# Patient Record
Sex: FEMALE | Race: WHITE | NOT HISPANIC OR LATINO | URBAN - METROPOLITAN AREA
[De-identification: names, ages, dates, MRNs, and addresses within clinical notes are randomized per-mention and may not be internally consistent; named-entity substitution may affect disease eponyms.]

---

## 2017-10-31 ENCOUNTER — OFFICE VISIT (OUTPATIENT)
Dept: NEUROSURGERY | Facility: CLINIC | Age: 67
End: 2017-10-31

## 2017-10-31 VITALS — WEIGHT: 144 LBS | BODY MASS INDEX: 23.99 KG/M2 | HEIGHT: 65 IN | TEMPERATURE: 97 F

## 2017-10-31 DIAGNOSIS — Z98.1 HISTORY OF LUMBAR FUSION: ICD-10-CM

## 2017-10-31 DIAGNOSIS — M51.36 DEGENERATIVE DISC DISEASE, LUMBAR: ICD-10-CM

## 2017-10-31 DIAGNOSIS — M43.16 SPONDYLOLISTHESIS OF LUMBAR REGION: Primary | ICD-10-CM

## 2017-10-31 DIAGNOSIS — M47.816 FACET ARTHRITIS OF LUMBAR REGION: ICD-10-CM

## 2017-10-31 PROCEDURE — 99203 OFFICE O/P NEW LOW 30 MIN: CPT | Performed by: NEUROLOGICAL SURGERY

## 2017-10-31 RX ORDER — OMEPRAZOLE 20 MG/1
20 CAPSULE, DELAYED RELEASE ORAL DAILY
Refills: 3 | COMMUNITY
Start: 2017-10-23

## 2017-10-31 RX ORDER — ANASTROZOLE 1 MG/1
1 TABLET ORAL DAILY
Refills: 8 | COMMUNITY
Start: 2017-09-30

## 2017-10-31 RX ORDER — LEVETIRACETAM 500 MG/1
500 TABLET ORAL 2 TIMES DAILY
Refills: 1 | COMMUNITY
Start: 2017-07-27

## 2017-10-31 RX ORDER — LORAZEPAM 0.5 MG/1
0.5 TABLET ORAL EVERY 8 HOURS PRN
COMMUNITY

## 2017-10-31 RX ORDER — DULOXETIN HYDROCHLORIDE 60 MG/1
60 CAPSULE, DELAYED RELEASE ORAL DAILY
Refills: 3 | COMMUNITY
Start: 2017-08-15

## 2017-10-31 RX ORDER — OXYCODONE AND ACETAMINOPHEN 10; 325 MG/1; MG/1
1 TABLET ORAL EVERY 4 HOURS PRN
COMMUNITY

## 2017-10-31 RX ORDER — TRAZODONE HYDROCHLORIDE 100 MG/1
100 TABLET ORAL NIGHTLY
Refills: 2 | COMMUNITY
Start: 2017-09-06

## 2017-10-31 RX ORDER — LEVOTHYROXINE SODIUM 0.05 MG/1
50 TABLET ORAL DAILY PRN
Refills: 1 | COMMUNITY
Start: 2017-10-23

## 2017-10-31 RX ORDER — PROMETHAZINE HYDROCHLORIDE 25 MG/1
25 TABLET ORAL EVERY 6 HOURS PRN
COMMUNITY

## 2017-10-31 RX ORDER — TRAMADOL HYDROCHLORIDE 50 MG/1
50 TABLET ORAL 2 TIMES DAILY PRN
Refills: 3 | COMMUNITY
Start: 2017-09-28

## 2017-10-31 RX ORDER — LOVASTATIN 40 MG/1
40 TABLET ORAL DAILY
Refills: 3 | COMMUNITY
Start: 2017-09-30

## 2017-10-31 RX ORDER — DONEPEZIL HYDROCHLORIDE 10 MG/1
10 TABLET, FILM COATED ORAL DAILY
Refills: 3 | COMMUNITY
Start: 2017-09-28

## 2017-10-31 RX ORDER — GABAPENTIN 800 MG/1
800 TABLET ORAL 3 TIMES DAILY
Refills: 1 | COMMUNITY
Start: 2017-09-28

## 2017-10-31 NOTE — PROGRESS NOTES
Patient: Kathy Montaño  : 1950    Primary Care Provider: Lon Jonas MD    Requesting Provider: As above        History    Chief Complaint: Chronic low back pain.    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  In the spring of 2015 she underwent L4-5 fusion and did well for a while.  She recently moved from [place] but 6 months ago and since then she's had bothersome and increased low back pain.  The pain will extend into her right inguinal area.  She's had rather global spasms involving the right leg.  She has neuropathy that afflicts her hands and feet.  She denies bowel or bladder difficulties.  Her symptoms are worse with walking, sitting, standing, or lying down.  Nothing makes it better.  She's had a lumbar epidural injection which was an uncomfortable procedure and really didn't make her feel better.    Review of Systems   Constitutional: Negative for activity change, appetite change, chills, diaphoresis, fatigue, fever and unexpected weight change.   HENT: Negative for congestion, dental problem, drooling, ear discharge, ear pain, facial swelling, hearing loss, mouth sores, nosebleeds, postnasal drip, rhinorrhea, sinus pressure, sneezing, sore throat, tinnitus, trouble swallowing and voice change.    Eyes: Negative for photophobia, pain, discharge, redness, itching and visual disturbance.   Respiratory: Negative for apnea, cough, choking, chest tightness, shortness of breath, wheezing and stridor.    Cardiovascular: Negative for chest pain, palpitations and leg swelling.   Gastrointestinal: Negative for abdominal distention, abdominal pain, anal bleeding, blood in stool, constipation, diarrhea, nausea, rectal pain and vomiting.   Endocrine: Negative for cold intolerance, heat intolerance, polydipsia, polyphagia and polyuria.   Genitourinary: Negative for decreased urine volume, difficulty urinating,  "dysuria, enuresis, flank pain, frequency, genital sores, hematuria and urgency.   Musculoskeletal: Positive for back pain. Negative for arthralgias, gait problem, joint swelling, myalgias, neck pain and neck stiffness.   Skin: Negative for color change, pallor, rash and wound.   Allergic/Immunologic: Negative for environmental allergies, food allergies and immunocompromised state.   Neurological: Negative for dizziness, tremors, seizures, syncope, facial asymmetry, speech difficulty, weakness, light-headedness, numbness and headaches.   Hematological: Negative for adenopathy. Does not bruise/bleed easily.   Psychiatric/Behavioral: Negative for agitation, behavioral problems, confusion, decreased concentration, dysphoric mood, hallucinations, self-injury, sleep disturbance and suicidal ideas. The patient is not nervous/anxious and is not hyperactive.        The patient's past medical history, past surgical history, family history, and social history have been reviewed at length in the electronic medical record.    Physical Exam:   Temp 97 °F (36.1 °C)  Ht 64.5\" (163.8 cm)  Wt 144 lb (65.3 kg)  BMI 24.34 kg/m2  CONSTITUTIONAL: Patient is well-nourished, pleasant and appears stated age.  CV: Heart regular rate and rhythm without murmur, rub, or gallop.  PULMONARY: Lungs are clear to ascultation.  MUSCULOSKELETAL:  Straight leg raising is negative.  Terrence's Sign is negative.  ROM in back normal.  Tenderness in the back to palpation is not observed.  Both knees are postsurgical.  NEUROLOGICAL:  Orientation, memory, attention span, language function, and cognition have been examined and are intact.  Strength is intact in the lower extremities to direct testing.  Muscle tone is normal throughout.  Station and gait are normal.  Sensation is intact to light touch testing throughout.  Deep tendon reflexes are 1+ and symmetrical.  Coordination is intact.      Medical Decision Making    Data Review:   Plain films of her " lumbar spine dated 10/19/16 reveal changes related to her decompression and fusion with pedicle screw stabilization at L4-5.  MRI of the lumbar spine dated 9/1/17 demonstrates changes related to her fusion at L4-5 where there remains a grade 1 listhesis.  There is moderate stenosis at L2-3 and L3-4.  Diffuse degenerative disc and degenerative joint disease are noted as well.    Diagnosis:   1.  Mechanical low back pain.  2.  Lumbar degenerative disc disease.  3.  Lumbar degenerative joint disease.  4.  Lumbar stenosis without neurogenic claudication.    Treatment Options:   There is currently no role for surgical intervention in this setting.  One might consider facet blocks and potentially rhizotomies.  A course of physical therapy might also prove productive.  She will follow-up in my clinic on an as-needed basis.       Diagnosis Plan   1. Spondylolisthesis of lumbar region     2. Degenerative disc disease, lumbar     3. Facet arthritis of lumbar region     4. History of lumbar fusion         Scribed for Flavio Ann MD by Irma Cummings CMA on 10/31/2017 at 1:31 PM    I, Dr. Ann, personally performed the services described in the documentation, as scribed in my presence, and it is both accurate and complete.